# Patient Record
Sex: MALE | Race: WHITE | NOT HISPANIC OR LATINO | Employment: FULL TIME | URBAN - METROPOLITAN AREA
[De-identification: names, ages, dates, MRNs, and addresses within clinical notes are randomized per-mention and may not be internally consistent; named-entity substitution may affect disease eponyms.]

---

## 2019-04-04 ENCOUNTER — TELEPHONE (OUTPATIENT)
Dept: PODIATRY | Facility: CLINIC | Age: 44
End: 2019-04-04

## 2019-04-04 ENCOUNTER — HOSPITAL ENCOUNTER (EMERGENCY)
Facility: HOSPITAL | Age: 44
Discharge: HOME | End: 2019-04-04
Attending: INTERNAL MEDICINE
Payer: COMMERCIAL

## 2019-04-04 ENCOUNTER — OFFICE VISIT (OUTPATIENT)
Dept: PODIATRY | Facility: CLINIC | Age: 44
End: 2019-04-04
Payer: COMMERCIAL

## 2019-04-04 VITALS
TEMPERATURE: 98 F | HEART RATE: 148 BPM | HEIGHT: 68 IN | WEIGHT: 245 LBS | RESPIRATION RATE: 18 BRPM | DIASTOLIC BLOOD PRESSURE: 79 MMHG | BODY MASS INDEX: 37.13 KG/M2 | SYSTOLIC BLOOD PRESSURE: 124 MMHG | OXYGEN SATURATION: 99 %

## 2019-04-04 VITALS
DIASTOLIC BLOOD PRESSURE: 85 MMHG | WEIGHT: 245 LBS | OXYGEN SATURATION: 97 % | TEMPERATURE: 98 F | HEART RATE: 134 BPM | RESPIRATION RATE: 16 BRPM | HEIGHT: 69 IN | SYSTOLIC BLOOD PRESSURE: 149 MMHG | BODY MASS INDEX: 36.29 KG/M2

## 2019-04-04 DIAGNOSIS — S93.691A RUPTURE OF PLANTAR FASCIA OF RIGHT FOOT, INITIAL ENCOUNTER: Primary | ICD-10-CM

## 2019-04-04 DIAGNOSIS — M76.821 POSTERIOR TIBIAL TENDONITIS, RIGHT: ICD-10-CM

## 2019-04-04 DIAGNOSIS — M72.2 PLANTAR FASCIITIS, RIGHT: ICD-10-CM
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
## 2019-04-04 PROCEDURE — 99203 PR OFFICE/OUTPT VISIT, NEW, LEVL III, 30-44 MIN: ICD-10-PCS | Mod: S$GLB,,, | Performed by: PODIATRIST

## 2019-04-04 PROCEDURE — 63600175 PHARM REV CODE 636 W HCPCS: Performed by: INTERNAL MEDICINE

## 2019-04-04 PROCEDURE — 99203 OFFICE O/P NEW LOW 30 MIN: CPT | Mod: S$GLB,,, | Performed by: PODIATRIST

## 2019-04-04 PROCEDURE — 99999 PR PBB SHADOW E&M-EST. PATIENT-LVL III: ICD-10-PCS | Mod: PBBFAC,,, | Performed by: PODIATRIST

## 2019-04-04 PROCEDURE — 96372 THER/PROPH/DIAG INJ SC/IM: CPT

## 2019-04-04 PROCEDURE — 73630 XR FOOT COMPLETE 3 VIEW RIGHT: ICD-10-PCS | Mod: 26,RT,, | Performed by: RADIOLOGY

## 2019-04-04 PROCEDURE — 99284 EMERGENCY DEPT VISIT MOD MDM: CPT | Mod: 25

## 2019-04-04 PROCEDURE — 3008F PR BODY MASS INDEX (BMI) DOCUMENTED: ICD-10-PCS | Mod: S$GLB,,, | Performed by: PODIATRIST

## 2019-04-04 PROCEDURE — 3008F BODY MASS INDEX DOCD: CPT | Mod: S$GLB,,, | Performed by: PODIATRIST

## 2019-04-04 PROCEDURE — 73630 X-RAY EXAM OF FOOT: CPT | Mod: 26,RT,, | Performed by: RADIOLOGY

## 2019-04-04 PROCEDURE — 99999 PR PBB SHADOW E&M-EST. PATIENT-LVL III: CPT | Mod: PBBFAC,,, | Performed by: PODIATRIST

## 2019-04-04 PROCEDURE — 73630 X-RAY EXAM OF FOOT: CPT | Mod: TC,FY,RT

## 2019-04-04 RX ORDER — HYDROCODONE BITARTRATE AND ACETAMINOPHEN 10; 325 MG/1; MG/1
1 TABLET ORAL
Status: DISCONTINUED | OUTPATIENT
Start: 2019-04-04 | End: 2019-04-04 | Stop reason: HOSPADM

## 2019-04-04 RX ORDER — CYCLOBENZAPRINE HCL 10 MG
10 TABLET ORAL 3 TIMES DAILY PRN
COMMUNITY

## 2019-04-04 RX ORDER — LISINOPRIL 20 MG/1
20 TABLET ORAL DAILY
COMMUNITY

## 2019-04-04 RX ORDER — NAPROXEN 500 MG/1
500 TABLET ORAL 2 TIMES DAILY WITH MEALS
Qty: 60 TABLET | Refills: 0 | Status: SHIPPED | OUTPATIENT
Start: 2019-04-04

## 2019-04-04 RX ORDER — DICLOFENAC SODIUM 75 MG/1
75 TABLET, DELAYED RELEASE ORAL 2 TIMES DAILY
COMMUNITY
End: 2019-04-18

## 2019-04-04 RX ORDER — KETOROLAC TROMETHAMINE 30 MG/ML
60 INJECTION, SOLUTION INTRAMUSCULAR; INTRAVENOUS
Status: COMPLETED | OUTPATIENT
Start: 2019-04-04 | End: 2019-04-04

## 2019-04-04 RX ORDER — DEXAMETHASONE SODIUM PHOSPHATE 100 MG/10ML
10 INJECTION INTRAMUSCULAR; INTRAVENOUS
Status: COMPLETED | OUTPATIENT
Start: 2019-04-04 | End: 2019-04-04

## 2019-04-04 RX ORDER — CITALOPRAM 10 MG/1
10 TABLET ORAL DAILY
COMMUNITY

## 2019-04-04 RX ORDER — PRAVASTATIN SODIUM 40 MG/1
40 TABLET ORAL DAILY
COMMUNITY

## 2019-04-04 RX ORDER — DICLOFENAC SODIUM 10 MG/G
2 GEL TOPICAL 3 TIMES DAILY
Qty: 100 G | Refills: 2 | Status: SHIPPED | OUTPATIENT
Start: 2019-04-04

## 2019-04-04 RX ADMIN — KETOROLAC TROMETHAMINE 60 MG: 30 INJECTION, SOLUTION INTRAMUSCULAR; INTRAVENOUS at 02:04

## 2019-04-04 RX ADMIN — DEXAMETHASONE SODIUM PHOSPHATE 10 MG: 10 INJECTION INTRAMUSCULAR; INTRAVENOUS at 02:04

## 2019-04-04 NOTE — LETTER
April 4, 2019      [facility] - Podiatry/Wound Care  [address] [place] [address]  Phone: [phone]  Fax: [phone]       Patient: Senthil White   YOB: 1975  Date of Visit: 04/04/2019    To Whom It May Concern:    Umair White  was at [facility] on 04/04/2019. He may return to work/school on 04/08/2019 with no restrictions. If you have any questions or concerns, or if I can be of further assistance, please do not hesitate to contact me.    Sincerely,    Danelle Leija LPN

## 2019-04-04 NOTE — ED PROVIDER NOTES
Encounter Date: 2019       History     Chief Complaint   Patient presents with    Foot Pain     Patient has a history of mild plantar fasciitis.  He was running up and a stage where his wife was performing and he tripped.  He felt a loud pop in his foot after which became very painful, with swelling and bruising.  He did not twist her or hit the foot.  On inspection a he has bruising on the medial aspect of the ft with some mild swelling and exquisite tenderness.  Was felt that he has a rupture of U the plantar fascia or tendons or both.        Review of patient's allergies indicates:   Allergen Reactions    Pcn [penicillins]      Pt. Was told as a child . He is not aware of reaction     Past Medical History:   Diagnosis Date    DJD (degenerative joint disease)     HTN (hypertension)     HTN (hypertension)     Hyperlipidemia     Sciatica      Past Surgical History:   Procedure Laterality Date    CHOLECYSTECTOMY       No family history on file.  Social History     Tobacco Use    Smoking status: Former Smoker     Types: Cigarettes     Last attempt to quit: 4/3/2013     Years since quittin.0    Smokeless tobacco: Never Used    Tobacco comment: electronic cig at times   Substance Use Topics    Alcohol use: Yes    Drug use: No     Review of Systems   Constitutional: Negative for fever.   HENT: Negative for sore throat.    Respiratory: Negative for shortness of breath.    Cardiovascular: Negative for chest pain.   Gastrointestinal: Negative for nausea.   Genitourinary: Negative for dysuria.   Musculoskeletal: Negative for back pain.   Skin: Negative for rash.   Neurological: Negative for weakness.   Hematological: Does not bruise/bleed easily.   All other systems reviewed and are negative.      Physical Exam     Initial Vitals [19 0220]   BP Pulse Resp Temp SpO2   (!) 149/85 (!) 134 16 97.6 °F (36.4 °C) 97 %      MAP       --         Physical Exam    Nursing note and vitals  reviewed.  Constitutional: Vital signs are normal. He appears well-developed and well-nourished. He is active and cooperative.   HENT:   Head: Normocephalic and atraumatic.   Eyes: Conjunctivae and lids are normal. Lids are everted and swept, no foreign bodies found.   Neck: Trachea normal, normal range of motion and full passive range of motion without pain. Neck supple.   Cardiovascular: Normal rate, regular rhythm, S1 normal, S2 normal, normal heart sounds, intact distal pulses and normal pulses.  No extrasystoles are present.    Abdominal: Soft. Normal appearance and bowel sounds are normal.   Musculoskeletal: Normal range of motion.   Patient has swelling, bruising, and marked tenderness in the medial aspect of the arch of the left foot.  This plantar fascia is exquisitely tender all the way to the heel.  He has full motion of his toes both in flexion and extension.  Felt this is most likely plantar fascia.   Neurological: He is alert. He has normal reflexes. GCS eye subscore is 4. GCS verbal subscore is 5. GCS motor subscore is 6.   Skin: Skin is warm, dry and intact. Capillary refill takes less than 2 seconds.   Psychiatric: He has a normal mood and affect. His speech is normal and behavior is normal. Cognition and memory are normal.         ED Course   Procedures  Labs Reviewed - No data to display       Imaging Results          X-Ray Foot Complete Right (In process)               X-Rays:   Independently Interpreted Readings:   Other Readings:  X-ray of the foot is negative for any fractures or dislocations    Medical Decision Making:   Clinical Tests:   Radiological Study: Ordered and Reviewed  ED Management:  Patient has a acute rupture of a soft tissue in the foot.  Most likely the plantar fascia but a tendon rupture cannot be excluded.  He is to see the podiatrist and follow-up and to remain off foot with crutches until seen.  He is to not go back to work and walking until seen.                       Clinical Impression:       ICD-10-CM ICD-9-CM   1. Plantar fasciitis, right M72.2 728.71         Disposition:   Disposition: Discharged  Condition: Stable                        Asif Maier MD  04/04/19 0332

## 2019-04-04 NOTE — ED TRIAGE NOTES
Pt reports injury to right foot.  Scioto a popping sound.  Occurred around 2230 tonight and progressively got worse.  Pt took diclofenac and flexaril but did not help.

## 2019-04-04 NOTE — TELEPHONE ENCOUNTER
----- Message from Angie Houston sent at 4/4/2019  8:35 AM CDT -----  Contact: Patient  Type:  Same Day Appointment Request    Caller is requesting a same day appointment.  Caller declined first available appointment listed below.      Name of Caller:  Patient  When is the first available appointment?  04/09/2019  Symptoms: tore tendon  Best Call Back Number:  [phone] (home)   Additional Information:  Went to the emergency room and referred to see Dr. Davis by today or tomorrow per patient    Please contact to advise

## 2019-04-07 PROBLEM — S93.691A RUPTURE OF PLANTAR FASCIA OF RIGHT FOOT: Status: ACTIVE | Noted: 2019-04-07

## 2019-04-07 NOTE — PROGRESS NOTES
Subjective:      Patient ID: Senthil White is a 43 y.o. male.    Chief Complaint: Follow-up (hospital visit)  Patient presents with wife and friend for evaluation of pain right foot, injury last late last night when running along a stage. He tripped and heard a pop.  States he has been battling mild plantar fasciitis in this foot prior to this incident, feels he might of ruptured it.  He went to the ED last night around 2:00 a.m. for an x-ray which was negative.  Was dispensed crutches and told to stay off the area, referred to our office today.    ROS     Constitutional    Pleasant, well-nourished, no distress, well oriented    Cardiovascular          No chest pain, no shortness of breath    Respiratory          No cough, no congestion     Musculoskeletal         PAIN RIGHT FOOT, YES arthralgias/joint pain, YES back pain, no swelling in the extremities    Neurologic         No weakness, no numbness           Objective:      Physical Exam  Vascular         Arterial Pulses Right: posterior tibialis 2/4, dorsalis pedis 2/4, normal CFT   Arterial Pulses Left: posterior tibialis 2/4, dorsalis pedis 2/4, normal CFT   No lower extremity edema bilateral   Pedal skin temperature and color are normal bilateral     Integumentary   Edema medial right foot and ankle, no ecchymosis at this time          Neurological   Gross sensation intact    Musculoskeletal   Muscle Strength/Testing and Tone:    Weakness and heavy guarding in all directions against resistance right foot, normal tone bilateral   Joints, Bones, and Muscles:  Pain at plantar fascial insertion, most severe pain is along the medial band of the plantar fascia where posterior tibial tendon enters the arch        Presents with crutches nonweightbearing right    X-Ray Foot Complete Right   Details     Reading Physician Reading Date Result Priority   Bryn Ford MD 4/4/2019 STAT      Narrative     EXAMINATION:  XR FOOT COMPLETE 3 VIEW RIGHT    CLINICAL  HISTORY:  . Plantar fascial fibromatosis    TECHNIQUE:  AP, lateral, and oblique views of the right foot were performed.    COMPARISON:  None    FINDINGS:  No acute fracture or dislocation.  No significant soft tissue swelling.    The joint spaces are preserved.  The tarsal bones are intact.  Os peroneum present.  Normal tarsometatarsal alignment.    Small plantar and moderate dorsal calcaneal spurs.      Impression       No acute radiographic findings of the right foot.    Calcaneal spurs.                   Assessment:       Encounter Diagnoses   Name Primary?    Rupture of plantar fascia of right foot, initial encounter Yes    Posterior tibial tendonitis, right     Fall due to stumbling, initial encounter          Plan:       Senthil was seen today for follow-up.    Diagnoses and all orders for this visit:    Rupture of plantar fascia of right foot, initial encounter  -     AIR CAST WALKER BOOT FOR HOME USE    Posterior tibial tendonitis, right    Fall due to stumbling, initial encounter    Other orders  -     diclofenac sodium (VOLTAREN) 1 % Gel; Apply 2 g topically 3 (three) times daily.      Reviewed x-rays taken in the ED with patient  reviewed most likely partial rupture plantar fascial medial band, this was demonstrated for patient on foot model.  Related concern regarding posterior tibial tendon, is functioning but muscle weakness in all directions.  Advised patient this injury requires non weight bearing until he can comfortablby utilized walking boot for immobilization.  At that point he can gradually transition to bearing weight, must have an arch support in the boot.  If not significant improvement would recommend MRI in 1 week.  Patient was in understanding and agreement with treatment plan.  Ace wrap applied to right ft and ankle  Instructed patient on ice /cool therapy and frequency this should be performed daily.  Prescribed Voltaren gel and explained how to apply daily  Instructed patient to take  600 mg ibuprofen 3 times a day with food for 2 days, then decrease to twice a day, then if improving once daily,  discontinue if stomach upset  Counseled the patient on his conditions, their implications and medical management.  Instructed patient to contact the office with any changes, questions, concerns, worsening of symptoms. Patient/family verbalized understanding.   Total face-to-face time, exam, assessment, treatment, discussion, documentation 30 minutes, more than half this time spent on consultation and coordination of care.  Follow up 1 week      This note was created using [employer] voice recognition software that occasionally misinterpreted phrases or words.

## 2019-04-11 ENCOUNTER — OFFICE VISIT (OUTPATIENT)
Dept: PODIATRY | Facility: CLINIC | Age: 44
End: 2019-04-11
Payer: COMMERCIAL

## 2019-04-11 VITALS
SYSTOLIC BLOOD PRESSURE: 134 MMHG | HEART RATE: 122 BPM | BODY MASS INDEX: 37.13 KG/M2 | DIASTOLIC BLOOD PRESSURE: 80 MMHG | WEIGHT: 245 LBS | TEMPERATURE: 99 F | HEIGHT: 68 IN

## 2019-04-11 DIAGNOSIS — M76.821 POSTERIOR TIBIAL TENDONITIS, RIGHT: ICD-10-CM

## 2019-04-11 DIAGNOSIS — S93.691D RUPTURE OF PLANTAR FASCIA OF RIGHT FOOT, SUBSEQUENT ENCOUNTER: Primary | ICD-10-CM
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
## 2019-04-11 PROCEDURE — 99999 PR PBB SHADOW E&M-EST. PATIENT-LVL III: ICD-10-PCS | Mod: PBBFAC,,, | Performed by: PODIATRIST

## 2019-04-11 PROCEDURE — 99999 PR PBB SHADOW E&M-EST. PATIENT-LVL III: CPT | Mod: PBBFAC,,, | Performed by: PODIATRIST

## 2019-04-11 PROCEDURE — 3008F PR BODY MASS INDEX (BMI) DOCUMENTED: ICD-10-PCS | Mod: S$GLB,,, | Performed by: PODIATRIST

## 2019-04-11 PROCEDURE — 3008F BODY MASS INDEX DOCD: CPT | Mod: S$GLB,,, | Performed by: PODIATRIST

## 2019-04-11 PROCEDURE — 99214 PR OFFICE/OUTPT VISIT, EST, LEVL IV, 30-39 MIN: ICD-10-PCS | Mod: S$GLB,,, | Performed by: PODIATRIST

## 2019-04-11 PROCEDURE — 99214 OFFICE O/P EST MOD 30 MIN: CPT | Mod: S$GLB,,, | Performed by: PODIATRIST

## 2019-04-15 NOTE — PROGRESS NOTES
Subjective:      Patient ID: Senthil White is a 43 y.o. male.    Chief Complaint: Follow-up; Foot Pain; Foot Problem; and Foot Injury  Patient presents with wife  for followup injury to the right foot 4/4/2019. Patient reports not much change, utilizing crutches, cannot probe much weight on his foot.  He has been using ice often and ibuprofen and voltaren as directed. Pain level 4/10.      ROS     Constitutional    Pleasant, well-nourished, no distress, well oriented    Cardiovascular          No chest pain, no shortness of breath    Respiratory          No cough, no congestion     Musculoskeletal         PAIN RIGHT FOOT, YES arthralgias/joint pain, YES back pain, no swelling in the extremities           Objective:      Physical Exam    Patient presents nonweightbearing utilizing crutches    Vascular         Arterial Pulses Right: posterior tibialis 2/4, dorsalis pedis 2/4, normal CFT   No lower extremity edema   Pedal skin temperature and color are normal      Integumentary   Edema medial right foot and ankle much improved, no ecchymosis          Neurological   Gross sensation intact    Musculoskeletal   Muscle Strength/Testing and Tone:    Weakness and guarding in all directions against resistance right foot, not much improvement  Joints, Bones, and Muscles:  Pain at plantar fascial insertion and posterior tibial tendon enters the arch                Assessment:       Encounter Diagnoses   Name Primary?    Rupture of plantar fascia of right foot, subsequent encounter Yes    Posterior tibial tendonitis, right     Fall due to stumbling, initial encounter          Plan:       Senthil was seen today for follow-up, foot pain, foot problem and foot injury.    Diagnoses and all orders for this visit:    Rupture of plantar fascia of right foot, subsequent encounter  -     MRI Foot (Hindfoot) Right Without Contrast; Future    Posterior tibial tendonitis, right    Fall due to stumbling, initial encounter      Advised  patient although the area is improved muscle strength  Has not, concern for partial rupture of either plantar fascia or posterior tibial tendon which is area of greater concern.  Recommended MRI.  Patient was in understanding and agreement. Order dispensed today.  Instructed patient to continue walking boot and nonweightbearing status  Until receive results of MRI  Is to remain out of work until we receive MRI results  Continue ice /cool therapy and frequency this should be performed daily.  Prescribed Voltaren gel and explained how to apply daily  Reduce Ibuprofen 600mg to 3 twice a day with food,  discontinue if stomach upset  Counseled the patient on his conditions, their implications and medical management.  Instructed patient to contact the office with any changes, questions, concerns, worsening of symptoms. Patient/family verbalized understanding.   Total face-to-face time, exam, assessment, treatment, discussion, documentation 25 minutes, more than half this time spent on consultation and coordination of care.  Follow up about 1 week after MRI      This note was created using [employer] voice recognition software that occasionally misinterpreted phrases or words.

## 2019-04-16 ENCOUNTER — HOSPITAL ENCOUNTER (OUTPATIENT)
Dept: RADIOLOGY | Facility: HOSPITAL | Age: 44
Discharge: HOME | End: 2019-04-16
Attending: PODIATRIST
Payer: COMMERCIAL

## 2019-04-16 DIAGNOSIS — S93.691D RUPTURE OF PLANTAR FASCIA OF RIGHT FOOT, SUBSEQUENT ENCOUNTER: ICD-10-CM

## 2019-04-16 PROCEDURE — 73718 MRI LOWER EXTREMITY W/O DYE: CPT | Mod: 26,RT,, | Performed by: RADIOLOGY

## 2019-04-16 PROCEDURE — 73718 MRI FOOT (HINDFOOT) RIGHT WITHOUT CONTRAST: ICD-10-PCS | Mod: 26,RT,, | Performed by: RADIOLOGY

## 2019-04-16 PROCEDURE — 73718 MRI LOWER EXTREMITY W/O DYE: CPT | Mod: TC,RT

## 2019-04-18 ENCOUNTER — OFFICE VISIT (OUTPATIENT)
Dept: PODIATRY | Facility: CLINIC | Age: 44
End: 2019-04-18
Payer: COMMERCIAL

## 2019-04-18 VITALS
WEIGHT: 245 LBS | HEART RATE: 130 BPM | TEMPERATURE: 99 F | BODY MASS INDEX: 37.13 KG/M2 | HEIGHT: 68 IN | SYSTOLIC BLOOD PRESSURE: 126 MMHG | DIASTOLIC BLOOD PRESSURE: 79 MMHG
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
## 2019-04-18 DIAGNOSIS — S93.691D RUPTURE OF PLANTAR FASCIA OF RIGHT FOOT, SUBSEQUENT ENCOUNTER: Primary | ICD-10-CM

## 2019-04-18 DIAGNOSIS — M77.8 FLEXOR HALLUCIS LONGUS TENDONITIS: ICD-10-CM

## 2019-04-18 DIAGNOSIS — S93.601D SPRAIN OF RIGHT FOOT, SUBSEQUENT ENCOUNTER: ICD-10-CM

## 2019-04-18 PROCEDURE — 3008F PR BODY MASS INDEX (BMI) DOCUMENTED: ICD-10-PCS | Mod: S$GLB,,, | Performed by: PODIATRIST

## 2019-04-18 PROCEDURE — 99999 PR PBB SHADOW E&M-EST. PATIENT-LVL III: ICD-10-PCS | Mod: PBBFAC,,, | Performed by: PODIATRIST

## 2019-04-18 PROCEDURE — 3008F BODY MASS INDEX DOCD: CPT | Mod: S$GLB,,, | Performed by: PODIATRIST

## 2019-04-18 PROCEDURE — 99999 PR PBB SHADOW E&M-EST. PATIENT-LVL III: CPT | Mod: PBBFAC,,, | Performed by: PODIATRIST

## 2019-04-18 PROCEDURE — 99214 PR OFFICE/OUTPT VISIT, EST, LEVL IV, 30-39 MIN: ICD-10-PCS | Mod: S$GLB,,, | Performed by: PODIATRIST

## 2019-04-18 PROCEDURE — 99214 OFFICE O/P EST MOD 30 MIN: CPT | Mod: S$GLB,,, | Performed by: PODIATRIST

## 2019-04-23 NOTE — PROGRESS NOTES
Subjective:      Patient ID: Senthil White is a 43 y.o. male.    Chief Complaint: Follow-up; Foot Pain; and Foot Problem  Patient presents with wife for followup injury to the right foot on 4/4/2019.  Patient relates he has been wearing walking boot, still using crutches, cannot put full weight down in the boot without  pain.  Reports arch support in the boot does feel good along with compression utilizing Ace bandage, but not much improvement. Pain level 4/10.      ROS     Constitutional    Pleasant, well-nourished, no distress, well oriented    Cardiovascular          No chest pain, no shortness of breath    Respiratory          No cough, no congestion     Musculoskeletal         PAIN RIGHT FOOT, YES arthralgias/joint pain, YES back pain, no swelling in the extremities           Objective:      Physical Exam    Vascular         Arterial Pulses Right: posterior tibialis 2/4, dorsalis pedis 2/4, normal CFT   No lower extremity edema   Pedal skin temperature and color are normal      Integumentary   No edema medial right foot and ankle          Neurological   Gross sensation intact    Musculoskeletal   Muscle Strength/Testing and Tone:  Slight improvement in strength, still heavy guarding in all directions against resistance right foot  Joints, Bones, and Muscles:  Pain at center of the medial band of the plantar fascial          MRI Foot (Hindfoot) Right Without Contrast   Order: [id number]   Status:  Final result   Visible to patient:  No (Not Released) Next appt:  None Dx:  Rupture of plantar fascia of right fo...   Details     Reading Physician Reading Date Result Priority   Bryn Ford MD 4/16/2019 Routine      Narrative     EXAMINATION:  MRI FOOT (HINDFOOT) RIGHT WITHOUT CONTRAST    CLINICAL HISTORY:  Disloc & sprain of joints & ligaments at ankl, ft & toe lev;  Other sprain of right foot, subsequent encounter    TECHNIQUE:  MRI ankle performed per routine protocol without  contrast.    COMPARISON:  None.    FINDINGS:  Ligaments: Anterior and posterior inferior tibiofibular ligaments are intact.  Anterior talofibular, calcaneofibular and posterior talofibular ligaments are intact.  Deep and superficial components of deltoid ligament are intact.  Spring ligament is intact with mild increased signal consistent with a mild sprain.  Adjacent small ganglion cyst measuring 6 mm.  The Lisfranc ligament complex is intact.    Tendons: Flexor hallucis longus tendon is intact with T2 hyperintense fluid distending the sheath consistent with mild tenosynovitis.  Posterior tibial tendon and flexor digitorum longus tendon are normal in course and signal intensity.  The dorsal ankle extensor and peroneus tendons are intact.  The Achilles tendon is intact.  Prominent enthesophyte formation at the insertion of the Achilles tendon.    Joints: No joint effusions. Tibiotalar and subtalar cartilage maintained.    Bones:  No fracture or infiltrative process.    Miscellaneous: There is thickening and signal abnormality at the origin of the plantar fascia consistent with a partial thickness tear.  There is adjacent perifascial edema.  Subchondral bone marrow edema is present within the adjacent calcaneus.  The proximal abductor digiti minimi muscle demonstrates mild T2 hyperintensity consistent with a mild strain.  The sinus tarsi and tarsal tunnel are unremarkable.      Impression       1. Partial-thickness tear at the origin of the plantar fascia with adjacent perifascial edema.  2. Mild strain of the abductor digiti minimi muscle.  3. Mild sprain of the spring ligament within adjacent small ganglion cyst.  4. Mild flexor hallucis longus tenosynovitis.                     Assessment:       Encounter Diagnoses   Name Primary?    Rupture of plantar fascia of right foot, subsequent encounter Yes    Sprain of right foot, subsequent encounter     Fall due to stumbling, subsequent encounter - Right Foot      Flexor hallucis longus tendonitis - Right Foot          Plan:       Senthil was seen today for follow-up, foot pain and foot problem.    Diagnoses and all orders for this visit:    Rupture of plantar fascia of right foot, subsequent encounter  -     Ambulatory consult to Physical Therapy    Sprain of right foot, subsequent encounter    Fall due to stumbling, subsequent encounter - Right Foot    Flexor hallucis longus tendonitis - Right Foot      Reviewed and dispensed copy of MRI results with patient.  Advised no additional findings other than already suspected partial thickness tear of the plantar fascia, mild strain/ sprain of the ft/adjacent structures.  Advised patient no  Evidence of injury to adjacent tendon.  Reassured patient due to location  This type of injury is typically slow to heal, requires arch support, compression, mobilization 4-6 weeks just like fracture.  Advised patient he needs to wear the boot at all times of weight-bearing, if sitting with his foot elevated especially when applying ice he can do some very mild, gentle stretches and range of motion of the long is not painful.  Continue ice /cool therapy and frequency this should be performed daily.  Prescribed Voltaren gel and explained how to apply daily  Ibuprofen 600mg to  twice a day with food,  discontinue if stomach upset  Counseled the patient on his conditions, their implications and medical management.  Instructed patient to contact the office with any changes, questions, concerns, worsening of symptoms. Patient/family verbalized understanding.   Total face-to-face time, exam, assessment, treatment, discussion, documentation 25 minutes, more than half this time spent on consultation and coordination of care.  Follow up 4 weeks      This note was created using [employer] voice recognition software that occasionally misinterpreted phrases or words.

## 2019-05-28 ENCOUNTER — TELEPHONE (OUTPATIENT)
Dept: PODIATRY | Facility: CLINIC | Age: 44
End: 2019-05-28

## 2019-05-28 NOTE — TELEPHONE ENCOUNTER
----- Message from Hailey Coates sent at 5/28/2019 11:23 AM CDT -----  Contact: Self  Patient has his last PT appt this Thursday 5/30 and would like an appt with Dr Davis that afternoon.  He needs the appt to determine if he can go back to work.  Call back at [phone] (home) .  Thanks

## 2019-06-20 ENCOUNTER — OFFICE VISIT (OUTPATIENT)
Dept: PODIATRY | Facility: CLINIC | Age: 44
End: 2019-06-20
Payer: COMMERCIAL

## 2019-06-20 VITALS
RESPIRATION RATE: 18 BRPM | HEIGHT: 68 IN | DIASTOLIC BLOOD PRESSURE: 82 MMHG | BODY MASS INDEX: 37.13 KG/M2 | OXYGEN SATURATION: 98 % | WEIGHT: 245 LBS | SYSTOLIC BLOOD PRESSURE: 119 MMHG | HEART RATE: 113 BPM | TEMPERATURE: 98 F

## 2019-06-20 DIAGNOSIS — M72.2 PLANTAR FASCIITIS: ICD-10-CM

## 2019-06-20 DIAGNOSIS — S93.691D RUPTURE OF PLANTAR FASCIA OF RIGHT FOOT, SUBSEQUENT ENCOUNTER: Primary | ICD-10-CM

## 2019-06-20 PROCEDURE — 99213 OFFICE O/P EST LOW 20 MIN: CPT | Mod: S$GLB,,, | Performed by: PODIATRIST

## 2019-06-20 PROCEDURE — 99999 PR PBB SHADOW E&M-EST. PATIENT-LVL III: ICD-10-PCS | Mod: PBBFAC,,, | Performed by: PODIATRIST

## 2019-06-20 PROCEDURE — 3008F PR BODY MASS INDEX (BMI) DOCUMENTED: ICD-10-PCS | Mod: S$GLB,,, | Performed by: PODIATRIST

## 2019-06-20 PROCEDURE — 99213 PR OFFICE/OUTPT VISIT, EST, LEVL III, 20-29 MIN: ICD-10-PCS | Mod: S$GLB,,, | Performed by: PODIATRIST

## 2019-06-20 PROCEDURE — 99999 PR PBB SHADOW E&M-EST. PATIENT-LVL III: CPT | Mod: PBBFAC,,, | Performed by: PODIATRIST

## 2019-06-20 PROCEDURE — 3008F BODY MASS INDEX DOCD: CPT | Mod: S$GLB,,, | Performed by: PODIATRIST

## 2019-06-20 NOTE — LETTER
June 21, 2019      Airam Francisco, NP  [address] [facility] [place] [address]           [facility] - [facility]iatry/Wound [facility]  [address] [place] [address]  Phone: [phone]  Fax: [phone]          Patient: Senthil White   MR Number: [id number]   YOB: 1975   Date of Visit: 6/20/2019       Dear Airam Francisco:    Thank you for referring Senthil White to me for evaluation. Attached you will find relevant portions of my assessment and plan of care.    If you have questions, please do not hesitate to call me. I look forward to following Senthil White along with you.    Sincerely,    Mary Davis, LIZ    Enclosure  CC:  No Recipients    If you would like to receive this communication electronically, please contact [email or web address] or [phone] to request more information on [employer] Link access.    For providers and/or their staff who would like to refer a patient to [facility], please contact us through our one-stop-shop provider referral line, [facility], at [phone].    If you feel you have received this communication in error or would no longer like to receive these types of communications, please e-mail [email or web address]

## 2019-06-20 NOTE — LETTER
June 20, 2019      [facility]ry/[facility]ound Care  [address] [place] [address]  Phone: [phone]  Fax: [phone]       Patient: Senthil White   YOB: 1975  Date of Visit: 06/20/2019    To Whom It May Concern:    Umair White  was at [facility] on 06/20/2019. He may return to work/school on 06/20/2019 with no restrictions. If you have any questions or concerns, or if I can be of further assistance, please do not hesitate to contact me.    Sincerely,        Trang Rolon RN

## 2019-06-22 NOTE — PROGRESS NOTES
Subjective:      Patient ID: Senthil White is a 43 y.o. male.    Chief Complaint: Follow-up  Patient presents for follow up rupture plantar fascia right foot.  Patient reports he has been going to physical therapy at [facility].  Reports he has been out of the walking boot since for 2 weeks, and wearing tennis shoes with arch supports.  States he is doing pretty well, does have pain in the morning, stiffness that takes about an hour to loosen.  Patient feels he is ready to return to work and would like a release today.  Does state his employer is aware of his injury and will work with him to gradually return to prolong standing and walking as tolerated. Pain level 2/10      ROS     Constitutional    Pleasant, well-nourished, no distress, well oriented    Cardiovascular          No chest pain, no shortness of breath    Respiratory          No cough, no congestion     Musculoskeletal         PAIN RIGHT FOOT, YES arthralgias/joint pain, YES back pain, no swelling in the extremities           Objective:      Physical Exam    Vascular         Arterial Pulses Right: posterior tibialis 2/4, dorsalis pedis 2/4, normal CFT   No lower extremity edema   Pedal skin temperature and color are normal      Integumentary   No edema right foot and          Neurological   Gross sensation intact    Musculoskeletal   Muscle Strength/Testing and Tone:  Full strength, no guarding right foot  Joints, Bones, and Muscles: No pain medial band  in the center of the arch but there is discomfort as the medial band inserts on the calcaneus           Assessment:       Encounter Diagnoses   Name Primary?    Rupture of plantar fascia of right foot, subsequent encounter Yes    Plantar fasciitis - Right Foot          Plan:       Senthil was seen today for follow-up.    Diagnoses and all orders for this visit:    Rupture of plantar fascia of right foot, subsequent encounter    Plantar fasciitis - Right Foot      Advised patient area of  rupture appears to be completely healed, however he still does have inflammation at the plantar fascial insertion, this is the area pre-existing plantar fasciitis.  Explained the patient arch support is crucial.  We reviewed again appropriate arch support which must be full length, firm, removing existing insole from tennis shoes and making sure the shoe accommodates arch support comfortably.  Explained the patient they are to be worn at all times, in an out of work as well as at home, absolutely no flat shoes or walking barefoot.  Advised patient light stretch should be done in the morning before getting out of bed and he should immediately be putting his tennis shoes with arch support on before his feet touch the floor.  Explained as long as he is having discomfort, tightness in the morning he is at risk for another rupture.  Encouraged patient to continue physical therapy for a few more weeks.  Continue ice /cool therapy  Continue Voltaren gel  2-3x daily  Light stretching throughout the day and evening.  Counseled the patient on his conditions, their implications and medical management.  Instructed patient to contact the office with any changes, questions, concerns, worsening of symptoms, or if remaining discomfort has not resolved in the next 2 weeks. Patient verbalized understanding.   Dispensed released to return to work  Total face-to-face time, exam, assessment, treatment, discussion, documentation 25 minutes, more than half this time spent on consultation and coordination of care.  Follow up as needed      This note was created using M*Modal voice recognition software that occasionally misinterpreted phrases or words.

## 2022-03-31 ENCOUNTER — HOSPITAL ENCOUNTER (EMERGENCY)
Facility: HOSPITAL | Age: 47
Discharge: HOME | End: 2022-03-31

## 2022-03-31 VITALS
BODY MASS INDEX: 40.92 KG/M2 | OXYGEN SATURATION: 95 % | DIASTOLIC BLOOD PRESSURE: 66 MMHG | TEMPERATURE: 99 F | SYSTOLIC BLOOD PRESSURE: 116 MMHG | WEIGHT: 270 LBS | HEART RATE: 97 BPM | RESPIRATION RATE: 16 BRPM | HEIGHT: 68 IN

## 2022-03-31 DIAGNOSIS — G43.009 MIGRAINE WITHOUT AURA AND WITHOUT STATUS MIGRAINOSUS, NOT INTRACTABLE: Primary | ICD-10-CM

## 2022-03-31 DIAGNOSIS — R53.1 WEAKNESS: ICD-10-CM

## 2022-03-31 LAB
ALBUMIN SERPL BCP-MCNC: 4.1 G/DL (ref 3.5–5.2)
ALP SERPL-CCNC: 45 U/L (ref 55–135)
ALT SERPL W/O P-5'-P-CCNC: 35 U/L (ref 10–44)
AMPHET+METHAMPHET UR QL: NEGATIVE
ANION GAP SERPL CALC-SCNC: 9 MMOL/L (ref 8–16)
APTT BLDCRRT: 26.8 SEC (ref 21–32)
AST SERPL-CCNC: 26 U/L (ref 10–40)
BARBITURATES UR QL SCN>200 NG/ML: NEGATIVE
BASOPHILS # BLD AUTO: 0.03 K/UL (ref 0–0.2)
BASOPHILS NFR BLD: 0.4 % (ref 0–1.9)
BENZODIAZ UR QL SCN>200 NG/ML: NEGATIVE
BILIRUB SERPL-MCNC: 0.6 MG/DL (ref 0.1–1)
BILIRUB UR QL STRIP: NEGATIVE
BUN SERPL-MCNC: 7 MG/DL (ref 6–20)
BZE UR QL SCN: NEGATIVE
CALCIUM SERPL-MCNC: 9.2 MG/DL (ref 8.7–10.5)
CANNABINOIDS UR QL SCN: ABNORMAL
CHLORIDE SERPL-SCNC: 104 MMOL/L (ref 95–110)
CLARITY UR: CLEAR
CO2 SERPL-SCNC: 27 MMOL/L (ref 23–29)
COLOR UR: YELLOW
CREAT SERPL-MCNC: 0.8 MG/DL (ref 0.5–1.4)
CREAT UR-MCNC: 37.2 MG/DL (ref 23–375)
DIFFERENTIAL METHOD: NORMAL
EOSINOPHIL # BLD AUTO: 0.2 K/UL (ref 0–0.5)
EOSINOPHIL NFR BLD: 2.9 % (ref 0–8)
ERYTHROCYTE [DISTWIDTH] IN BLOOD BY AUTOMATED COUNT: 12.8 % (ref 11.5–14.5)
EST. GFR  (AFRICAN AMERICAN): >60 ML/MIN/1.73 M^2
EST. GFR  (NON AFRICAN AMERICAN): >60 ML/MIN/1.73 M^2
GLUCOSE SERPL-MCNC: 91 MG/DL (ref 70–110)
GLUCOSE UR QL STRIP: NEGATIVE
HCT VFR BLD AUTO: 43.7 % (ref 40–54)
HGB BLD-MCNC: 15 G/DL (ref 14–18)
HGB UR QL STRIP: NEGATIVE
IMM GRANULOCYTES # BLD AUTO: 0.02 K/UL (ref 0–0.04)
IMM GRANULOCYTES NFR BLD AUTO: 0.2 % (ref 0–0.5)
INR PPP: 1 (ref 0.8–1.2)
KETONES UR QL STRIP: NEGATIVE
LEUKOCYTE ESTERASE UR QL STRIP: NEGATIVE
LYMPHOCYTES # BLD AUTO: 2.6 K/UL (ref 1–4.8)
LYMPHOCYTES NFR BLD: 30.8 % (ref 18–48)
MCH RBC QN AUTO: 30.1 PG (ref 27–31)
MCHC RBC AUTO-ENTMCNC: 34.3 G/DL (ref 32–36)
MCV RBC AUTO: 88 FL (ref 82–98)
METHADONE UR QL SCN>300 NG/ML: NEGATIVE
MONOCYTES # BLD AUTO: 1 K/UL (ref 0.3–1)
MONOCYTES NFR BLD: 12.1 % (ref 4–15)
NEUTROPHILS # BLD AUTO: 4.5 K/UL (ref 1.8–7.7)
NEUTROPHILS NFR BLD: 53.6 % (ref 38–73)
NITRITE UR QL STRIP: NEGATIVE
NRBC BLD-RTO: 0 /100 WBC
OPIATES UR QL SCN: NEGATIVE
PCP UR QL SCN>25 NG/ML: NEGATIVE
PH UR STRIP: 8 [PH] (ref 5–8)
PLATELET # BLD AUTO: 291 K/UL (ref 150–450)
PMV BLD AUTO: 10 FL (ref 9.2–12.9)
POCT GLUCOSE: 90 MG/DL (ref 70–110)
POTASSIUM SERPL-SCNC: 3.5 MMOL/L (ref 3.5–5.1)
PROT SERPL-MCNC: 7.4 G/DL (ref 6–8.4)
PROT UR QL STRIP: NEGATIVE
PROTHROMBIN TIME: 10.8 SEC (ref 9–12.5)
RBC # BLD AUTO: 4.98 M/UL (ref 4.6–6.2)
SODIUM SERPL-SCNC: 140 MMOL/L (ref 136–145)
SP GR UR STRIP: 1.01 (ref 1–1.03)
TOXICOLOGY INFORMATION: ABNORMAL
TROPONIN I SERPL DL<=0.01 NG/ML-MCNC: <0.006 NG/ML (ref 0–0.03)
URN SPEC COLLECT METH UR: NORMAL
UROBILINOGEN UR STRIP-ACNC: NEGATIVE EU/DL
WBC # BLD AUTO: 8.37 K/UL (ref 3.9–12.7)

## 2022-03-31 PROCEDURE — 99285 EMERGENCY DEPT VISIT HI MDM: CPT | Mod: 25

## 2022-03-31 PROCEDURE — 84484 ASSAY OF TROPONIN QUANT: CPT | Performed by: NURSE PRACTITIONER

## 2022-03-31 PROCEDURE — 93010 EKG 12-LEAD: ICD-10-PCS | Mod: ,,, | Performed by: INTERNAL MEDICINE

## 2022-03-31 PROCEDURE — 80053 COMPREHEN METABOLIC PANEL: CPT | Performed by: NURSE PRACTITIONER

## 2022-03-31 PROCEDURE — 80307 DRUG TEST PRSMV CHEM ANLYZR: CPT | Performed by: NURSE PRACTITIONER

## 2022-03-31 PROCEDURE — 96374 THER/PROPH/DIAG INJ IV PUSH: CPT

## 2022-03-31 PROCEDURE — 85610 PROTHROMBIN TIME: CPT | Performed by: NURSE PRACTITIONER

## 2022-03-31 PROCEDURE — 93005 ELECTROCARDIOGRAM TRACING: CPT

## 2022-03-31 PROCEDURE — 70450 CT HEAD/BRAIN W/O DYE: CPT | Mod: TC

## 2022-03-31 PROCEDURE — 82962 GLUCOSE BLOOD TEST: CPT

## 2022-03-31 PROCEDURE — 70450 CT HEAD WITHOUT CONTRAST: ICD-10-PCS | Mod: 26,,, | Performed by: RADIOLOGY

## 2022-03-31 PROCEDURE — 85730 THROMBOPLASTIN TIME PARTIAL: CPT | Performed by: NURSE PRACTITIONER

## 2022-03-31 PROCEDURE — 81003 URINALYSIS AUTO W/O SCOPE: CPT | Mod: 59 | Performed by: NURSE PRACTITIONER

## 2022-03-31 PROCEDURE — 70450 CT HEAD/BRAIN W/O DYE: CPT | Mod: 26,,, | Performed by: RADIOLOGY

## 2022-03-31 PROCEDURE — 85025 COMPLETE CBC W/AUTO DIFF WBC: CPT | Performed by: NURSE PRACTITIONER

## 2022-03-31 PROCEDURE — 93010 ELECTROCARDIOGRAM REPORT: CPT | Mod: ,,, | Performed by: INTERNAL MEDICINE

## 2022-03-31 PROCEDURE — 63600175 PHARM REV CODE 636 W HCPCS: Performed by: EMERGENCY MEDICINE

## 2022-03-31 PROCEDURE — 96375 TX/PRO/DX INJ NEW DRUG ADDON: CPT

## 2022-03-31 RX ORDER — ONDANSETRON 4 MG/1
4 TABLET, FILM COATED ORAL EVERY 6 HOURS PRN
Qty: 15 TABLET | Refills: 0 | Status: SHIPPED | OUTPATIENT
Start: 2022-03-31

## 2022-03-31 RX ORDER — HYDROMORPHONE HYDROCHLORIDE 2 MG/ML
1 INJECTION, SOLUTION INTRAMUSCULAR; INTRAVENOUS; SUBCUTANEOUS
Status: COMPLETED | OUTPATIENT
Start: 2022-03-31 | End: 2022-03-31

## 2022-03-31 RX ORDER — ONDANSETRON 2 MG/ML
4 INJECTION INTRAMUSCULAR; INTRAVENOUS
Status: COMPLETED | OUTPATIENT
Start: 2022-03-31 | End: 2022-03-31

## 2022-03-31 RX ORDER — BUTALBITAL, ACETAMINOPHEN AND CAFFEINE 50; 325; 40 MG/1; MG/1; MG/1
1 TABLET ORAL EVERY 4 HOURS PRN
Qty: 30 TABLET | Refills: 1 | Status: SHIPPED | OUTPATIENT
Start: 2022-03-31 | End: 2022-04-30

## 2022-03-31 RX ADMIN — HYDROMORPHONE HYDROCHLORIDE 1 MG: 2 INJECTION, SOLUTION INTRAMUSCULAR; INTRAVENOUS; SUBCUTANEOUS at 08:03

## 2022-03-31 RX ADMIN — ONDANSETRON 4 MG: 2 INJECTION INTRAMUSCULAR; INTRAVENOUS at 08:03

## 2022-04-01 NOTE — ED PROVIDER NOTES
Encounter Date: 3/31/2022       History     Chief Complaint   Patient presents with    Hypertension     145/105 at home    Headache    Numbness     Left arm since waking this morning - last normal at 3/31/22 0100     Well-developed 46-year-old male comes emergency room complaints of extensive headache which woke the patient up this morning.  The pain is located on the patient's left anterior forehead.  10/10.  Pressure type pain.  No previous episodes.  The wife reports the patient is somewhat of a delayed response time and slurred speech.  The patient does have weakness in his left upper extremity.  Left lower extremities normal with normal DTRs.  Pupils are 3 mm bilateral.  Equal brisk.  There appears to be no other deficits at this time.  The patient did have a headache and developed nausea and vomited once.  Patient has a past medical history of hypertension.  Otherwise unremarkable.  Does smoke a little marijuana occasionally.  Father has a history of brain tumor.        Review of patient's allergies indicates:   Allergen Reactions    Pcn [penicillins]      Pt. Was told as a child . He is not aware of reaction     Past Medical History:   Diagnosis Date    DJD (degenerative joint disease)     HTN (hypertension)     HTN (hypertension)     Hyperlipidemia     Sciatica      Past Surgical History:   Procedure Laterality Date    CHOLECYSTECTOMY       History reviewed. No pertinent family history.  Social History     Tobacco Use    Smoking status: Former Smoker     Types: Cigarettes     Quit date: 4/3/2013     Years since quittin.9    Smokeless tobacco: Never Used    Tobacco comment: electronic cig at times   Substance Use Topics    Alcohol use: Yes    Drug use: No     Review of Systems   Constitutional: Negative.    HENT: Negative.    Respiratory: Negative.    Cardiovascular: Negative.    Gastrointestinal: Negative.    Musculoskeletal: Negative.    Skin: Negative.    Neurological: Positive for  headaches (Positive headache PTA.).   All other systems reviewed and are negative.      Physical Exam     Initial Vitals   BP Pulse Resp Temp SpO2   03/31/22 1905 03/31/22 1905 03/31/22 1905 03/31/22 1916 03/31/22 1905   (!) 145/105 94 16 99 °F (37.2 °C) 97 %      MAP       --                Physical Exam    Nursing note and vitals reviewed.  Constitutional: He appears well-developed and well-nourished.   HENT:   Head: Normocephalic.   Eyes: Pupils are equal, round, and reactive to light.   3 mm bilateral.  Equal brisk.   Neck: Neck supple.   Normal range of motion.  Cardiovascular: Normal rate, regular rhythm and normal heart sounds.   Pulmonary/Chest: Breath sounds normal.   Abdominal: Abdomen is soft. Bowel sounds are normal.   Musculoskeletal:         General: Normal range of motion.      Cervical back: Normal range of motion and neck supple.     Neurological: He is alert and oriented to person, place, and time. He has normal strength and normal reflexes.   The patient has an obvious weakness of his left upper extremity grass.  3/5.  The right is 5/5.   Skin: Skin is warm.   Psychiatric: He has a normal mood and affect. Thought content normal.         ED Course   Procedures  Labs Reviewed   COMPREHENSIVE METABOLIC PANEL - Abnormal; Notable for the following components:       Result Value    Alkaline Phosphatase 45 (*)     All other components within normal limits   DRUG SCREEN PANEL, URINE EMERGENCY - Abnormal; Notable for the following components:    THC Presumptive Positive (*)     All other components within normal limits    Narrative:     Preferred Collection Type->Urine, Clean Catch  Specimen Source->Urine   CBC W/ AUTO DIFFERENTIAL   URINALYSIS, REFLEX TO URINE CULTURE    Narrative:     Preferred Collection Type->Urine, Clean Catch  Specimen Source->Urine   TROPONIN I   PROTIME-INR   APTT   POCT GLUCOSE     EKG Readings: (Independently Interpreted)   Rate 91, normal sinus rhythm, normal axis, normal QRS,  "normal R-wave progression, normal T-wave.  Normal EKG.       Imaging Results          CT Head Without Contrast (In process)                  Medications   HYDROmorphone (PF) injection 1 mg (1 mg Intravenous Given 3/31/22 2052)   ondansetron injection 4 mg (4 mg Intravenous Given 3/31/22 2053)     Medical Decision Making:   Differential Diagnosis:   Anemia, electrolyte abnormality, infection, sepsis, dehydration, urinate check infection, cancerous process, viral infection, hereditary hypokalemia syndrome.  CVA, TIA, hemorrhagic stroke, embolic stroke.    ED Management:  The patient is stable this time.  He definitely has a weakness of the left upper extremity with  strength.  There is mild slurred delayed speech.  The patient reports that he has a "fog" around him.  Feels like he is delayed in slow.  There is no other neurological deficit that I can appreciate at this time.  Pupils are equal.  The rest of his neurological examination is within normal limits.  CT by my read appears normal.  Awaiting for the radiological read at this time.    Re-evaluation this patient demonstrates no weakness of his left arm or  strength.  There is complete resolution as there is complete resolution of his migraine headache.  The patient has not been taking his blood pressure medicines and I suspect possibly a migraine induced because of his hypertension.  I discussed this with him.  He will not miss any further medications.    CT is indeed negative for any type of acute pathology.  Laboratory values are negative for this as well.  I will treat the patient as if a migraine headache.  I will discharge patient home at this time.  Have the patient follow-up with primary care physician for further evaluation.  I will give Fioricet for control of the migraine headache.             ED Course as of 03/31/22 2305   Thu Mar 31, 2022   1919 CT contacted to advise stat CT. [MR]      ED Course User Index  [MR] Reji Laurent NP         "     Clinical Impression:   Final diagnoses:  [R53.1] Weakness  [G43.009] Migraine without aura and without status migrainosus, not intractable (Primary)          ED Disposition Condition    Discharge Stable        ED Prescriptions     Medication Sig Dispense Start Date End Date Auth. Provider    butalbital-acetaminophen-caffeine -40 mg (FIORICET, ESGIC) -40 mg per tablet Take 1 tablet by mouth every 4 (four) hours as needed for Pain. 30 tablet 3/31/2022 4/30/2022 Celso Patel MD    ondansetron (ZOFRAN) 4 MG tablet Take 1 tablet (4 mg total) by mouth every 6 (six) hours as needed. 15 tablet 3/31/2022  Celso Patel MD        Follow-up Information    None          Celso Patel MD  03/31/22 5315

## 2022-04-01 NOTE — DISCHARGE INSTRUCTIONS
Return emergency with any worsening headache, blurred vision, altered mental status, or weakness.  Follow-up with primary care physician as necessary.